# Patient Record
Sex: MALE | Race: WHITE | URBAN - METROPOLITAN AREA
[De-identification: names, ages, dates, MRNs, and addresses within clinical notes are randomized per-mention and may not be internally consistent; named-entity substitution may affect disease eponyms.]

---

## 2018-03-26 ENCOUNTER — TELEPHONE (OUTPATIENT)
Dept: OBGYN CLINIC | Facility: HOSPITAL | Age: 63
End: 2018-03-26

## 2018-03-26 NOTE — TELEPHONE ENCOUNTER
Pt would like someone from the office to call him to verify that there is a release of info signed by him  He is having difficulty getting records from 06/2016 when he saw dr Milad Barraza in [place]  I gave him the number to [facility]ro

## 2018-03-27 NOTE — TELEPHONE ENCOUNTER
Carrie White, would you be able to help Cotton verify that his medical records can be transferred    Thank you,  Dina Dunlap